# Patient Record
Sex: MALE | Race: OTHER
[De-identification: names, ages, dates, MRNs, and addresses within clinical notes are randomized per-mention and may not be internally consistent; named-entity substitution may affect disease eponyms.]

---

## 2018-09-12 ENCOUNTER — HOSPITAL ENCOUNTER (EMERGENCY)
Dept: HOSPITAL 80 - FED | Age: 33
LOS: 8 days | Discharge: HOME | End: 2018-09-20
Payer: COMMERCIAL

## 2018-09-12 VITALS — DIASTOLIC BLOOD PRESSURE: 97 MMHG | SYSTOLIC BLOOD PRESSURE: 134 MMHG
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
## 2018-09-12 DIAGNOSIS — S01.21XA: Primary | ICD-10-CM
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
## 2018-09-12 DIAGNOSIS — Z23: ICD-10-CM

## 2018-09-12 PROCEDURE — 09QKXZZ REPAIR NASAL MUCOSA AND SOFT TISSUE, EXTERNAL APPROACH: ICD-10-PCS | Performed by: EMERGENCY MEDICINE

## 2018-09-12 NOTE — EDPHY
HPI/HX/ROS/PE/MDM


Narrative: 


CHIEF COMPLAINT: Nose injury





HPI: 


The patient is a 34 y/o male complaining of a nose injury after falling and 

hitting his nose last night at 23:00, 8.5 hours ago. The patient was walking 

down some stairs when he tripped and subsequently hit his nose. He denies loss 

of conscious or any other known injury. He is unsure when his last tetanus shot 

was. No headache, neck pain, chest pain, shortness of breath, abdominal pain, 

urinary or bowel complaints, numbness, paresthesias, fever. 





REVIEW OF SYSTEMS:


Aside from elements discussed in the HPI, a comprehensive 10 system review of 

systems is otherwise negative.





PMH: Reflux, inguinal hernia repair





SOCIAL HISTORY: Lives in [place], single, employed





PHYSICAL EXAM:


General: Patient is alert, in no acute distress.


ENT: Eyes are normal to inspection.  Ear and throat inspection normal. W-shaped 

2.5 cm laceration on the bridge of the nose; no sinus or bridge of nose 

tenderness.  EOMI. 


Neck: Normal inspection.  Full range of motion.


Respiratory: No respiratory distress.  Breath sounds normal bilaterally.


Cardiovascular: Regular rate and rhythm.  Strong peripheral pulses.  Normal cap 

refill.


Neuro: Oriented x3.  Normal motor function.  Normal sensory function.





ED Course: 


0732: Reassessed patient and performed a laceration repair. Imaging studies are 

not indicated at this time as the patient has no sinus or bridge of nose 

tenderness.





Procedure: Laceration repair.


Verbal consent was obtained from the patient. The W-shaped 2.5 cm laceration on 

the bridge of the nose was anesthetized using Bupivacaine. The wound was 

cleaned with standard ED protocol, draped and explored to its base with a 

gloved finger. There were no deep structures involved. The wound was repaired 

in single layer technique with #3 6-0 Prolene and Dermabond. The wound repair 

was simple. The procedure was performed by myself, Dr. Vazquez.





0800: Reassessed patient and discussed return precautions. I have advised him 

to return to the emergency department in 7 days for a suture removal; patient 

is comfortable with this plan.








- Data Points


Medications Given: 


 








Discontinued Medications





Diphtheria/Tetanus/Acell Pertussis (Boostrix)  0.5 ml IM .ONCE ONE


   Stop: 09/12/18 07:35


   Last Admin: 09/12/18 07:37 Dose:  0.5 ml








General


Time Seen by Provider: 09/12/18 07:21


Initial Vital Signs: 


 Initial Vital Signs











Temperature (C)  37.1 C   09/12/18 07:22


 


Heart Rate  78   09/12/18 07:22


 


Respiratory Rate  18   09/12/18 07:22


 


Blood Pressure  134/97 H  09/12/18 07:22


 


O2 Sat (%)  98   09/12/18 07:22








 











O2 Delivery Mode               Room Air














Allergies/Adverse Reactions: 


 





No Known Allergies Allergy (Verified 09/12/18 07:21)


 








Home Medications: 














 Medication  Instructions  Recorded


 


Miscellaneous Medical Supply [NO 1 ea MISC AD 01/31/14





HOME MEDS]  














Departure





- Departure


Disposition: Home, Routine, Self-Care


Clinical Impression: 


Laceration of nose


Qualifiers:


 Encounter type: initial encounter Qualified Code(s): S01.21XA - Laceration 

without foreign body of nose, initial encounter





Condition: Good


Instructions:  Care For Your Stitches (ED), Laceration (ED), Facial Laceration (

ED)


Additional Instructions: 


Sutures out in 7 days; please return to this emergency department for the 

suture removal.  Return to the Emergency Department for fever, redness, 

discharge from wound, increasing pain or other worsening of condition.





Referrals: 


Abdullahi Romero MD [Medical Doctor] - As per Instructions


[facility],. [Clinic] - As per Instructions


Report Scribed for: Harjit Vazquez


Report Scribed by: Sola Chavez


Date of Report: 09/12/18


Time of Report: 07:22


Physician Review and Approval Statement: Portions of this note were transcribed 

by an ED scribe.  I personally performed the history, physical exam, and 

medical decision making; and confirm the accuracy of the information in the 

transcribed note.